# Patient Record
Sex: MALE | Race: WHITE | NOT HISPANIC OR LATINO | Employment: UNEMPLOYED | URBAN - METROPOLITAN AREA
[De-identification: names, ages, dates, MRNs, and addresses within clinical notes are randomized per-mention and may not be internally consistent; named-entity substitution may affect disease eponyms.]

---

## 2017-12-19 ENCOUNTER — APPOINTMENT (OUTPATIENT)
Dept: CT IMAGING | Facility: HOSPITAL | Age: 27
End: 2017-12-19

## 2017-12-19 ENCOUNTER — HOSPITAL ENCOUNTER (OUTPATIENT)
Facility: HOSPITAL | Age: 27
Setting detail: OBSERVATION
Discharge: HOME | End: 2017-12-20
Attending: EMERGENCY MEDICINE | Admitting: SURGERY

## 2017-12-19 DIAGNOSIS — R10.31 RIGHT LOWER QUADRANT ABDOMINAL PAIN: Primary | ICD-10-CM

## 2017-12-19 DIAGNOSIS — D72.829 LEUKOCYTOSIS, UNSPECIFIED TYPE: ICD-10-CM

## 2017-12-19 DIAGNOSIS — K59.00 CONSTIPATION, UNSPECIFIED CONSTIPATION TYPE: ICD-10-CM

## 2017-12-19 PROBLEM — R10.9 ABDOMINAL PAIN: Status: ACTIVE | Noted: 2017-12-19

## 2017-12-19 LAB
ALBUMIN SERPL-MCNC: 4.9 G/DL (ref 3.5–5.2)
ALBUMIN/GLOB SERPL: 1.8 G/DL
ALP SERPL-CCNC: 46 U/L (ref 39–117)
ALT SERPL W P-5'-P-CCNC: 15 U/L (ref 1–41)
ANION GAP SERPL CALCULATED.3IONS-SCNC: 13.5 MMOL/L
AST SERPL-CCNC: 19 U/L (ref 1–40)
BASOPHILS # BLD AUTO: 0.03 10*3/MM3 (ref 0–0.2)
BASOPHILS NFR BLD AUTO: 0.2 % (ref 0–1.5)
BILIRUB SERPL-MCNC: 0.5 MG/DL (ref 0.1–1.2)
BILIRUB UR QL STRIP: NEGATIVE
BUN BLD-MCNC: 20 MG/DL (ref 6–20)
BUN/CREAT SERPL: 20.2 (ref 7–25)
CALCIUM SPEC-SCNC: 10 MG/DL (ref 8.6–10.5)
CHLORIDE SERPL-SCNC: 101 MMOL/L (ref 98–107)
CLARITY UR: CLEAR
CO2 SERPL-SCNC: 25.5 MMOL/L (ref 22–29)
COLOR UR: YELLOW
CREAT BLD-MCNC: 0.99 MG/DL (ref 0.76–1.27)
DEPRECATED RDW RBC AUTO: 42.6 FL (ref 37–54)
EOSINOPHIL # BLD AUTO: 0.15 10*3/MM3 (ref 0–0.7)
EOSINOPHIL NFR BLD AUTO: 0.9 % (ref 0.3–6.2)
ERYTHROCYTE [DISTWIDTH] IN BLOOD BY AUTOMATED COUNT: 13.3 % (ref 11.5–14.5)
GFR SERPL CREATININE-BSD FRML MDRD: 89 ML/MIN/1.73
GLOBULIN UR ELPH-MCNC: 2.8 GM/DL
GLUCOSE BLD-MCNC: 94 MG/DL (ref 65–99)
GLUCOSE UR STRIP-MCNC: NEGATIVE MG/DL
HCT VFR BLD AUTO: 43 % (ref 40.4–52.2)
HGB BLD-MCNC: 14.6 G/DL (ref 13.7–17.6)
HGB UR QL STRIP.AUTO: NEGATIVE
HOLD SPECIMEN: NORMAL
HOLD SPECIMEN: NORMAL
IMM GRANULOCYTES # BLD: 0.05 10*3/MM3 (ref 0–0.03)
IMM GRANULOCYTES NFR BLD: 0.3 % (ref 0–0.5)
KETONES UR QL STRIP: ABNORMAL
LEUKOCYTE ESTERASE UR QL STRIP.AUTO: NEGATIVE
LIPASE SERPL-CCNC: 16 U/L (ref 13–60)
LYMPHOCYTES # BLD AUTO: 2.02 10*3/MM3 (ref 0.9–4.8)
LYMPHOCYTES NFR BLD AUTO: 12.2 % (ref 19.6–45.3)
MCH RBC QN AUTO: 29.7 PG (ref 27–32.7)
MCHC RBC AUTO-ENTMCNC: 34 G/DL (ref 32.6–36.4)
MCV RBC AUTO: 87.4 FL (ref 79.8–96.2)
MONOCYTES # BLD AUTO: 1.27 10*3/MM3 (ref 0.2–1.2)
MONOCYTES NFR BLD AUTO: 7.7 % (ref 5–12)
NEUTROPHILS # BLD AUTO: 13.03 10*3/MM3 (ref 1.9–8.1)
NEUTROPHILS NFR BLD AUTO: 78.7 % (ref 42.7–76)
NITRITE UR QL STRIP: NEGATIVE
PH UR STRIP.AUTO: 5.5 [PH] (ref 5–8)
PLATELET # BLD AUTO: 311 10*3/MM3 (ref 140–500)
PMV BLD AUTO: 9.6 FL (ref 6–12)
POTASSIUM BLD-SCNC: 4.1 MMOL/L (ref 3.5–5.2)
PROT SERPL-MCNC: 7.7 G/DL (ref 6–8.5)
PROT UR QL STRIP: NEGATIVE
RBC # BLD AUTO: 4.92 10*6/MM3 (ref 4.6–6)
SODIUM BLD-SCNC: 140 MMOL/L (ref 136–145)
SP GR UR STRIP: 1.03 (ref 1–1.03)
UROBILINOGEN UR QL STRIP: ABNORMAL
WBC NRBC COR # BLD: 16.55 10*3/MM3 (ref 4.5–10.7)
WHOLE BLOOD HOLD SPECIMEN: NORMAL
WHOLE BLOOD HOLD SPECIMEN: NORMAL

## 2017-12-19 PROCEDURE — 85025 COMPLETE CBC W/AUTO DIFF WBC: CPT | Performed by: EMERGENCY MEDICINE

## 2017-12-19 PROCEDURE — 81003 URINALYSIS AUTO W/O SCOPE: CPT | Performed by: EMERGENCY MEDICINE

## 2017-12-19 PROCEDURE — 96361 HYDRATE IV INFUSION ADD-ON: CPT

## 2017-12-19 PROCEDURE — 99284 EMERGENCY DEPT VISIT MOD MDM: CPT

## 2017-12-19 PROCEDURE — 96360 HYDRATION IV INFUSION INIT: CPT

## 2017-12-19 PROCEDURE — G0378 HOSPITAL OBSERVATION PER HR: HCPCS

## 2017-12-19 PROCEDURE — 36415 COLL VENOUS BLD VENIPUNCTURE: CPT | Performed by: EMERGENCY MEDICINE

## 2017-12-19 PROCEDURE — 83690 ASSAY OF LIPASE: CPT | Performed by: EMERGENCY MEDICINE

## 2017-12-19 PROCEDURE — 0 IOPAMIDOL 61 % SOLUTION: Performed by: EMERGENCY MEDICINE

## 2017-12-19 PROCEDURE — 25810000003 SODIUM CHLORIDE 0.9 % WITH KCL 20 MEQ 20-0.9 MEQ/L-% SOLUTION: Performed by: SURGERY

## 2017-12-19 PROCEDURE — 74177 CT ABD & PELVIS W/CONTRAST: CPT

## 2017-12-19 PROCEDURE — 80053 COMPREHEN METABOLIC PANEL: CPT | Performed by: EMERGENCY MEDICINE

## 2017-12-19 RX ORDER — PROMETHAZINE HYDROCHLORIDE 25 MG/ML
12.5 INJECTION, SOLUTION INTRAMUSCULAR; INTRAVENOUS EVERY 6 HOURS PRN
Status: DISCONTINUED | OUTPATIENT
Start: 2017-12-19 | End: 2017-12-20 | Stop reason: HOSPADM

## 2017-12-19 RX ORDER — SODIUM CHLORIDE 9 MG/ML
125 INJECTION, SOLUTION INTRAVENOUS CONTINUOUS
Status: DISCONTINUED | OUTPATIENT
Start: 2017-12-19 | End: 2017-12-20 | Stop reason: HOSPADM

## 2017-12-19 RX ORDER — SODIUM CHLORIDE 0.9 % (FLUSH) 0.9 %
10 SYRINGE (ML) INJECTION AS NEEDED
Status: DISCONTINUED | OUTPATIENT
Start: 2017-12-19 | End: 2017-12-20 | Stop reason: HOSPADM

## 2017-12-19 RX ORDER — BUPRENORPHINE AND NALOXONE 8; 2 MG/1; MG/1
1 FILM, SOLUBLE BUCCAL; SUBLINGUAL DAILY
Status: DISCONTINUED | OUTPATIENT
Start: 2017-12-19 | End: 2017-12-20 | Stop reason: HOSPADM

## 2017-12-19 RX ORDER — SODIUM CHLORIDE AND POTASSIUM CHLORIDE 150; 900 MG/100ML; MG/100ML
150 INJECTION, SOLUTION INTRAVENOUS CONTINUOUS
Status: DISCONTINUED | OUTPATIENT
Start: 2017-12-19 | End: 2017-12-20 | Stop reason: HOSPADM

## 2017-12-19 RX ORDER — BUPRENORPHINE HYDROCHLORIDE AND NALOXONE HYDROCHLORIDE DIHYDRATE 8; 2 MG/1; MG/1
1 TABLET SUBLINGUAL 3 TIMES DAILY
COMMUNITY

## 2017-12-19 RX ORDER — PROMETHAZINE HYDROCHLORIDE 12.5 MG/1
12.5 TABLET ORAL EVERY 6 HOURS PRN
Status: DISCONTINUED | OUTPATIENT
Start: 2017-12-19 | End: 2017-12-20 | Stop reason: HOSPADM

## 2017-12-19 RX ORDER — SODIUM CHLORIDE 0.9 % (FLUSH) 0.9 %
1-10 SYRINGE (ML) INJECTION AS NEEDED
Status: DISCONTINUED | OUTPATIENT
Start: 2017-12-19 | End: 2017-12-20 | Stop reason: HOSPADM

## 2017-12-19 RX ORDER — PROMETHAZINE HYDROCHLORIDE 25 MG/1
12.5 SUPPOSITORY RECTAL EVERY 6 HOURS PRN
Status: DISCONTINUED | OUTPATIENT
Start: 2017-12-19 | End: 2017-12-20 | Stop reason: HOSPADM

## 2017-12-19 RX ORDER — NICOTINE 21 MG/24HR
1 PATCH, TRANSDERMAL 24 HOURS TRANSDERMAL EVERY 24 HOURS
Status: DISCONTINUED | OUTPATIENT
Start: 2017-12-19 | End: 2017-12-20 | Stop reason: HOSPADM

## 2017-12-19 RX ADMIN — SODIUM CHLORIDE 1000 ML: 9 INJECTION, SOLUTION INTRAVENOUS at 20:55

## 2017-12-19 RX ADMIN — SODIUM CHLORIDE 125 ML/HR: 9 INJECTION, SOLUTION INTRAVENOUS at 21:59

## 2017-12-19 RX ADMIN — IOPAMIDOL 95 ML: 612 INJECTION, SOLUTION INTRAVENOUS at 21:36

## 2017-12-19 RX ADMIN — NICOTINE 1 PATCH: 21 PATCH, EXTENDED RELEASE TRANSDERMAL at 23:25

## 2017-12-19 RX ADMIN — POTASSIUM CHLORIDE AND SODIUM CHLORIDE 150 ML/HR: 900; 150 INJECTION, SOLUTION INTRAVENOUS at 23:25

## 2017-12-19 NOTE — ED TRIAGE NOTES
Patient states his stomach started hurting about an hour ago and states it is in lower abdomen and also in his lower left and right quadrants

## 2017-12-20 VITALS
WEIGHT: 145 LBS | BODY MASS INDEX: 23.3 KG/M2 | HEIGHT: 66 IN | OXYGEN SATURATION: 98 % | TEMPERATURE: 98.6 F | SYSTOLIC BLOOD PRESSURE: 92 MMHG | HEART RATE: 77 BPM | DIASTOLIC BLOOD PRESSURE: 59 MMHG | RESPIRATION RATE: 16 BRPM

## 2017-12-20 LAB
ANION GAP SERPL CALCULATED.3IONS-SCNC: 8.4 MMOL/L
BASOPHILS # BLD AUTO: 0.02 10*3/MM3 (ref 0–0.2)
BASOPHILS NFR BLD AUTO: 0.2 % (ref 0–1.5)
BUN BLD-MCNC: 15 MG/DL (ref 6–20)
BUN/CREAT SERPL: 18.8 (ref 7–25)
CALCIUM SPEC-SCNC: 8.2 MG/DL (ref 8.6–10.5)
CHLORIDE SERPL-SCNC: 108 MMOL/L (ref 98–107)
CO2 SERPL-SCNC: 25.6 MMOL/L (ref 22–29)
CREAT BLD-MCNC: 0.8 MG/DL (ref 0.76–1.27)
DEPRECATED RDW RBC AUTO: 43.4 FL (ref 37–54)
EOSINOPHIL # BLD AUTO: 0.35 10*3/MM3 (ref 0–0.7)
EOSINOPHIL NFR BLD AUTO: 3.5 % (ref 0.3–6.2)
ERYTHROCYTE [DISTWIDTH] IN BLOOD BY AUTOMATED COUNT: 13.5 % (ref 11.5–14.5)
GFR SERPL CREATININE-BSD FRML MDRD: 114 ML/MIN/1.73
GLUCOSE BLD-MCNC: 72 MG/DL (ref 65–99)
HCT VFR BLD AUTO: 36.4 % (ref 40.4–52.2)
HGB BLD-MCNC: 12.1 G/DL (ref 13.7–17.6)
IMM GRANULOCYTES # BLD: 0.02 10*3/MM3 (ref 0–0.03)
IMM GRANULOCYTES NFR BLD: 0.2 % (ref 0–0.5)
LYMPHOCYTES # BLD AUTO: 3.47 10*3/MM3 (ref 0.9–4.8)
LYMPHOCYTES NFR BLD AUTO: 35.1 % (ref 19.6–45.3)
MCH RBC QN AUTO: 29.5 PG (ref 27–32.7)
MCHC RBC AUTO-ENTMCNC: 33.2 G/DL (ref 32.6–36.4)
MCV RBC AUTO: 88.8 FL (ref 79.8–96.2)
MONOCYTES # BLD AUTO: 0.89 10*3/MM3 (ref 0.2–1.2)
MONOCYTES NFR BLD AUTO: 9 % (ref 5–12)
NEUTROPHILS # BLD AUTO: 5.14 10*3/MM3 (ref 1.9–8.1)
NEUTROPHILS NFR BLD AUTO: 52 % (ref 42.7–76)
PLATELET # BLD AUTO: 257 10*3/MM3 (ref 140–500)
PMV BLD AUTO: 10.2 FL (ref 6–12)
POTASSIUM BLD-SCNC: 4.1 MMOL/L (ref 3.5–5.2)
RBC # BLD AUTO: 4.1 10*6/MM3 (ref 4.6–6)
SODIUM BLD-SCNC: 142 MMOL/L (ref 136–145)
WBC NRBC COR # BLD: 9.89 10*3/MM3 (ref 4.5–10.7)

## 2017-12-20 PROCEDURE — 80048 BASIC METABOLIC PNL TOTAL CA: CPT | Performed by: SURGERY

## 2017-12-20 PROCEDURE — 85025 COMPLETE CBC W/AUTO DIFF WBC: CPT | Performed by: SURGERY

## 2017-12-20 PROCEDURE — S0260 H&P FOR SURGERY: HCPCS | Performed by: SURGERY

## 2017-12-20 PROCEDURE — 99217 PR OBSERVATION CARE DISCHARGE MANAGEMENT: CPT | Performed by: SURGERY

## 2017-12-20 PROCEDURE — G0378 HOSPITAL OBSERVATION PER HR: HCPCS

## 2017-12-20 NOTE — PLAN OF CARE
Problem: Patient Care Overview (Adult)  Goal: Plan of Care Review  Outcome: Ongoing (interventions implemented as appropriate)   12/20/17 0513   Coping/Psychosocial Response Interventions   Plan Of Care Reviewed With patient   Patient Care Overview   Progress improving   Outcome Evaluation   Outcome Summary/Follow up Plan Pt admitted as observation from ED overnight for abdominal pain/constipation. NPO with IVF. No complaints of pain or n/v overnight. VSS      Goal: Discharge Needs Assessment  Outcome: Ongoing (interventions implemented as appropriate)   12/20/17 0513   Discharge Needs Assessment   Concerns To Be Addressed no discharge needs identified;denies needs/concerns at this time       Problem: Constipation (Adult)  Goal: Identify Related Risk Factors and Signs and Symptoms  Outcome: Outcome(s) achieved Date Met: 12/20/17 12/20/17 0513   Constipation   Signs and Symptoms (Constipation) abdomen firm, tender;abdominal pain/cramps;other (see comments)  (heavy stool burden on CT)     Goal: Effective Bowel Elimination  Outcome: Ongoing (interventions implemented as appropriate)   12/20/17 0513   Constipation (Adult)   Effective Bowel Elimination making progress toward outcome     Goal: Comfort  Outcome: Ongoing (interventions implemented as appropriate)   12/20/17 0513   Constipation (Adult)   Comfort making progress toward outcome       Problem: Pain, Acute (Adult)  Goal: Identify Related Risk Factors and Signs and Symptoms  Outcome: Ongoing (interventions implemented as appropriate)   12/20/17 0513   Pain, Acute   Related Risk Factors (Acute Pain) disease process     Goal: Acceptable Pain Control/Comfort Level  Outcome: Ongoing (interventions implemented as appropriate)   12/20/17 0513   Pain, Acute (Adult)   Acceptable Pain Control/Comfort Level making progress toward outcome

## 2017-12-20 NOTE — ED PROVIDER NOTES
" EMERGENCY DEPARTMENT ENCOUNTER    CHIEF COMPLAINT  Chief Complaint: abdominal pain  History given by: patient  History limited by: none  Room Number: 20/20  PMD: Provider Not In System      HPI:  Pt is a 27 y.o. male who presents complaining of \"stabbing\" suprapubic and \"cramping\" RLQ abdominal pain onset PTA while at work. Pain is aggravated by movement. Patient reports that he initially had diffuse scrotal discomfort that is now resolved. Associated sx include constipation for 2 days and vomiting x1 in the past 24 hours. There are no c/o fever, urinary sx, chest pain, SOA, cough, extremity swelling or other symptoms at this time. Patient is a frequent tobacco user but denies EtOH/recreational drug use.     Duration:  Since PTA  Onset: gradual  Timing: constant  Location: suprapubic and RLQ  Radiation: initially to scrotum, now resolved  Quality: \"stabbing\" suprapubic and \"cramping\" RLQ   Intensity/Severity: moderate  Progression: unchanged  Associated Symptoms: vomiting, constipation  Aggravating Factors: movement  Alleviating Factors: none stated  Previous Episodes: none stated  Treatment before arrival: one stated    PAST MEDICAL HISTORY  Active Ambulatory Problems     Diagnosis Date Noted   • No Active Ambulatory Problems     Resolved Ambulatory Problems     Diagnosis Date Noted   • No Resolved Ambulatory Problems     No Additional Past Medical History       PAST SURGICAL HISTORY  History reviewed. No pertinent surgical history.    FAMILY HISTORY  History reviewed. No pertinent family history.    SOCIAL HISTORY  Social History     Social History   • Marital status: Single     Spouse name: N/A   • Number of children: N/A   • Years of education: N/A     Occupational History   • Not on file.     Social History Main Topics   • Smoking status: Light Tobacco Smoker     Types: Cigarettes   • Smokeless tobacco: Not on file   • Alcohol use No   • Drug use: No   • Sexual activity: Not on file     Other Topics Concern " "  • Not on file     Social History Narrative   • No narrative on file       ALLERGIES  Penicillins    REVIEW OF SYSTEMS  Review of Systems   Constitutional: Negative for chills and fever.   HENT: Negative for sore throat and trouble swallowing.    Eyes: Negative for visual disturbance.   Respiratory: Negative for cough and shortness of breath.    Cardiovascular: Negative for chest pain and leg swelling.   Gastrointestinal: Positive for abdominal pain (\"stabbing\" suprapubic and \"cramping\" RLQ), constipation and vomiting. Negative for diarrhea.   Endocrine: Negative.    Genitourinary: Negative for decreased urine volume and frequency.        Radiating scrotal pain now resolved   Musculoskeletal: Negative for neck pain.   Skin: Negative for rash.   Allergic/Immunologic: Negative.    Neurological: Negative for weakness and numbness.   Hematological: Negative.    Psychiatric/Behavioral: Negative.    All other systems reviewed and are negative.      PHYSICAL EXAM  ED Triage Vitals   Temp Heart Rate Resp BP SpO2   12/19/17 1702 12/19/17 1702 12/19/17 1718 12/19/17 1717 12/19/17 1702   97.4 °F (36.3 °C) 83 18 102/70 94 %      Temp src Heart Rate Source Patient Position BP Location FiO2 (%)   12/19/17 1702 12/19/17 1702 -- -- --   Temporal Art Monitor          Physical Exam   Constitutional: He is oriented to person, place, and time.  Non-toxic appearance. He appears distressed.   HENT:   Head: Normocephalic and atraumatic.   Eyes: EOM are normal.   Neck: Normal range of motion.   Cardiovascular: Normal rate, regular rhythm and normal heart sounds.    No murmur heard.  Pulses:       Posterior tibial pulses are 2+ on the right side, and 2+ on the left side.   Pulmonary/Chest: Effort normal and breath sounds normal. No respiratory distress. He has no wheezes.   Abdominal: Soft. Bowel sounds are normal. There is tenderness (suprapbuic and RLQ). There is no rebound and no guarding.   Genitourinary:   Genitourinary Comments: " There is no testicle swelling or tenderness, no hernia, pt circumcised   Musculoskeletal: Normal range of motion. He exhibits no edema.   Neurological: He is alert and oriented to person, place, and time.   Skin: Skin is warm and dry.   Psychiatric: Affect normal.   Nursing note and vitals reviewed.      LAB RESULTS  Lab Results (last 24 hours)     Procedure Component Value Units Date/Time    CBC & Differential [[id number]] Collected:  12/19/17 1832    Specimen:  Blood Updated:  12/19/17 1847    Narrative:       The following orders were created for panel order CBC & Differential.  Procedure                               Abnormality         Status                     ---------                               -----------         ------                     CBC Auto Differential[[id number]]        Abnormal            Final result                 Please view results for these tests on the individual orders.    Comprehensive Metabolic Panel [[id number]] Collected:  12/19/17 1832    Specimen:  Blood Updated:  12/19/17 1912     Glucose 94 mg/dL      BUN 20 mg/dL      Creatinine 0.99 mg/dL      Sodium 140 mmol/L      Potassium 4.1 mmol/L      Chloride 101 mmol/L      CO2 25.5 mmol/L      Calcium 10.0 mg/dL      Total Protein 7.7 g/dL      Albumin 4.90 g/dL      ALT (SGPT) 15 U/L      AST (SGOT) 19 U/L      Alkaline Phosphatase 46 U/L      Total Bilirubin 0.5 mg/dL      eGFR Non African Amer 89 mL/min/1.73      Globulin 2.8 gm/dL      A/G Ratio 1.8 g/dL      BUN/Creatinine Ratio 20.2     Anion Gap 13.5 mmol/L     Lipase [[id number]]  (Normal) Collected:  12/19/17 1832    Specimen:  Blood Updated:  12/19/17 1912     Lipase 16 U/L     CBC Auto Differential [[id number]]  (Abnormal) Collected:  12/19/17 1832    Specimen:  Blood Updated:  12/19/17 1847     WBC 16.55 (H) 10*3/mm3      RBC 4.92 10*6/mm3      Hemoglobin 14.6 g/dL      Hematocrit 43.0 %      MCV 87.4 fL      MCH 29.7 pg      MCHC 34.0 g/dL      RDW 13.3 %      RDW-SD 42.6  fl      MPV 9.6 fL      Platelets 311 10*3/mm3      Neutrophil % 78.7 (H) %      Lymphocyte % 12.2 (L) %      Monocyte % 7.7 %      Eosinophil % 0.9 %      Basophil % 0.2 %      Immature Grans % 0.3 %      Neutrophils, Absolute 13.03 (H) 10*3/mm3      Lymphocytes, Absolute 2.02 10*3/mm3      Monocytes, Absolute 1.27 (H) 10*3/mm3      Eosinophils, Absolute 0.15 10*3/mm3      Basophils, Absolute 0.03 10*3/mm3      Immature Grans, Absolute 0.05 (H) 10*3/mm3     Urinalysis With / Culture If Indicated - Urine, Clean Catch [[id number]]  (Abnormal) Collected:  12/19/17 2048    Specimen:  Urine from Urine, Clean Catch Updated:  12/19/17 2101     Color, UA Yellow     Appearance, UA Clear     pH, UA 5.5     Specific Gravity, UA 1.029     Glucose, UA Negative     Ketones, UA 40 mg/dL (2+) (A)     Bilirubin, UA Negative     Blood, UA Negative     Protein, UA Negative     Leuk Esterase, UA Negative     Nitrite, UA Negative     Urobilinogen, UA 0.2 E.U./dL    Narrative:       Urine microscopic not indicated.          I ordered the above labs and reviewed the results    RADIOLOGY  CT Abdomen Pelvis With Contrast   Final Result   IMPRESSION :    1. Constipation without obstruction or acute inflammation           This report was finalized on 12/19/2017 9:47 PM by Ji Gtz MD.           appendix not seen on imaging    I ordered the above noted radiological studies.Discussed with Dr Gtz. Reviewed by me in PACS.       PROCEDURES  Procedures      PROGRESS AND CONSULTS  ED Course   1718: Ordered CBC, UA, lipase and CMP per protocol.    2041: Ordered CT Abd/Pelvis to evaluate abdominal pain. Ordered IVF for hydration.     2209: Rechecked patient who reports no improvement in his sx. Informed patient of CT results which revealed constipation but no obvious signs of appendicitis. However this does not r/o inflammation of the appendix. Discussed plan to consult with general surgery.     2223: Discussed patient's case with Dr. Styles,  general surgery, including concerns regarding patient inconsistent CT Abd/Pelvis and symptoms as well as elevated WBC. She agrees to admit the patient for observation and serial exams to further evaluate/treat as needed for the evolution of his abdominal discomfort.    2237: Informed patient that due to his presentation and inconsistent radiology results, Dr. Styles has agreed to admit him for observation/serial exam and r/o of appendicitis. Pt understands and agrees with the plan. All questions answered.    Discussed with pt's RN, Adriane, that pt make take his home dose of suboxone x 1 which he reports he has with him and requested for her to order a nicotine patch for the patient.    MEDICAL DECISION MAKING  Results were reviewed/discussed with the patient and they were also made aware of online access. Pt also made aware that some labs, such as cultures, will not be resulted during ER visit and follow up with PMD is necessary.     MDM  Number of Diagnoses or Management Options     Amount and/or Complexity of Data Reviewed  Clinical lab tests: ordered and reviewed (WBC-16.55)  Tests in the radiology section of CPT®: ordered and reviewed (CT Abd/Pelvis shows constipation without obstruction or acute inflammation)  Decide to obtain previous medical records or to obtain history from someone other than the patient: yes  Independent visualization of images, tracings, or specimens: yes    Patient Progress  Patient progress: stable         DIAGNOSIS  Final diagnoses:   Right lower quadrant abdominal pain   Constipation, unspecified constipation type   Leukocytosis, unspecified type       DISPOSITION  ADMISSION    Discussed treatment plan and reason for admission with pt/family and admitting physician.  Pt/family voiced understanding of the plan for admission for further testing/treatment as needed.       Latest Documented Vital Signs:  As of 10:40 PM  BP- 109/72 HR- 82 Temp- 97.4 °F (36.3 °C) (Temporal Artery ) O2 sat-  100%    --  Documentation assistance provided by mee Gray for Dr. Markham.  Information recorded by the scribadeel was done at my direction and has been verified and validated by me.         Julieth Gray  12/19/17 0735       Breana Markham MD  12/20/17 0720

## 2017-12-20 NOTE — DISCHARGE SUMMARY
Admitting date: 12/19/2017    Discharging date: 12/20/2017    Admitting diagnosis: Suprapubic and right lower abdominal pain    Discharging diagnoses: Benign abdomen    Admitting/discharging physician: Dr. Styles    Procedures: None    Hospital course:  This is a pleasant 27-year-old gentleman who is admitted to the emergency room for observation with an inconclusive CT scan for appendicitis and elevated white count of 16.  Patient's white count came down to 9 the following morning and pain had improved.  Patient was discharged to home in stable condition to follow-up as needed.        Olga Styles MD  General, Minimally Invasive and Endoscopic Surgery  [facility]    [address], [address]  [place], [place], [address]  P: [phone]  F: [phone]    Cc:  Provider Not In System

## 2017-12-20 NOTE — PROGRESS NOTES
Case Management Discharge Note    Final Note: Dc'd home via private auto    Discharge Placement     No information found        Other: Other (private auto)    Discharge Codes: 01  Discharge to home

## 2017-12-20 NOTE — PLAN OF CARE
Problem: Patient Care Overview (Adult)  Goal: Plan of Care Review  Outcome: Ongoing (interventions implemented as appropriate)   12/20/17 0926   Coping/Psychosocial Response Interventions   Plan Of Care Reviewed With patient   Patient Care Overview   Progress improving   Outcome Evaluation   Outcome Summary/Follow up Plan Pt hasn't complained of pain. Tolerating a regular breakfast. VSS, plan is for patient to go home today.       Problem: Constipation (Adult)  Goal: Effective Bowel Elimination  Outcome: Ongoing (interventions implemented as appropriate)    Goal: Comfort  Outcome: Ongoing (interventions implemented as appropriate)      Problem: Pain, Acute (Adult)  Goal: Identify Related Risk Factors and Signs and Symptoms  Outcome: Ongoing (interventions implemented as appropriate)    Goal: Acceptable Pain Control/Comfort Level  Outcome: Ongoing (interventions implemented as appropriate)

## 2017-12-20 NOTE — H&P
Chief Complaint   Patient presents with   • Abdominal Pain   • Nausea        Patient is a 27 y.o. male presented to the emergency room on 12/19/2017 with acute onset of suprapubic pain that radiated over onto the right side.  Patient reported that the pain started at 3:00 in the afternoon and continued to worsen until he called the ambulance.  Patient denied nausea, vomiting, chills, fever or diarrhea.  Patient had never had this pain before.  Patient described the pain as a crampy stabbing pain.  Patient reports nothing made it better moving made it worse.     History reviewed. No pertinent past medical history.  History reviewed. No pertinent surgical history.  History reviewed. No pertinent family history.  Social History   Substance Use Topics   • Smoking status: Light Tobacco Smoker     Types: Cigarettes   • Smokeless tobacco: None   • Alcohol use No         Current Facility-Administered Medications:   •  buprenorphine-naloxone (SUBOXONE) 8-2 MG film 1 film, 1 film, Sublingual, Daily, Breana Markham MD, Stopped at 12/19/17 2337  •  nicotine (NICODERM CQ) 21 MG/24HR patch 1 patch, 1 patch, Transdermal, Q24H, Breana Markham MD, 1 patch at 12/19/17 2325  •  promethazine (PHENERGAN) tablet 12.5 mg, 12.5 mg, Oral, Q6H PRN **OR** promethazine (PHENERGAN) injection 12.5 mg, 12.5 mg, Intramuscular, Q6H PRN **OR** promethazine (PHENERGAN) suppository 12.5 mg, 12.5 mg, Rectal, Q6H PRN, Olga Styles MD  •  sodium chloride 0.9 % flush 1-10 mL, 1-10 mL, Intravenous, PRN, Olga Styles MD  •  sodium chloride 0.9 % flush 10 mL, 10 mL, Intravenous, PRN, Breana Markham MD  •  sodium chloride 0.9 % infusion, 125 mL/hr, Intravenous, Continuous, Breana Markham MD, Stopped at 12/19/17 2321  •  sodium chloride 0.9 % with KCl 20 mEq/L infusion, 150 mL/hr, Intravenous, Continuous, Olga Styles MD, Last Rate: 150 mL/hr at 12/19/17 2325, 150 mL/hr at 12/19/17 8802    Review of Systems   General: Patient reports good  health  Eyes: No eye problems  Ears, nose, mouth and throat: No rhinitis, no hearing problems, no chronic cough  Cardiovascular/heart: Denies palpitations, syncope or chest pain  Respiratory/lung: Denies shortness of breath, hemoptysis, or dyspnea on exertion   Genital/urinary: No frequency, hematuria or dysuria  Hematological/lymphatic: Denies anemia or other problems  Musculoskeletal: No joint pain, no defects  Skin: No psoriasis or other skin issues  Neurological: No seizures or other neurological problems  Psychiatric: None  Endocrine: Negative  Gastro-intestinal: positive constipation, no diarrhea, no melena, no hematochezia  All other systems have been reviewed and are negative.  Vitals:    12/19/17 2243 12/19/17 2252 12/19/17 2300 12/20/17 0731   BP: 104/76  106/58 92/59   BP Location:    Right arm   Patient Position:    Lying   Pulse:    77   Resp:   16 16   Temp:  97.9 °F (36.6 °C) 98 °F (36.7 °C) 98.6 °F (37 °C)   TempSrc:  Oral Oral Oral   SpO2: 97%   98%   Weight:       Height:           Physical Exam  General/physcological:   Alert and oriented x3 in no acute distress  HEENT: Normal cephalic, atraumatic, PERRLA, EOMI, sclera anicteric, moist mucous membranes, neck is supple, no JVD, no carotid bruits, no thyromegaly no adenopathy  Respiratory: CTA and percussion  CVA: RRR, normal S1-S2, no murmurs, no gallops or rubs  GI: Positive BS, soft, nondistended, nontender, no rebound, no guarding, no hernias, no organomegaly and no masses  Musculoskeletal: Full range of motion, no clubbing, no cyanosis or edema  Neurovascular: Grossly intact    Patient does use tobacco products currently and I have counseled the patient to stop using tobacco products in the future.    Results from last 7 days  Lab Units 12/20/17  0551   WBC 10*3/mm3 9.89   HEMOGLOBIN g/dL 12.1*   HEMATOCRIT % 36.4*   PLATELETS 10*3/mm3 257       Results from last 7 days  Lab Units 12/20/17  0551   SODIUM mmol/L 142   POTASSIUM mmol/L 4.1    CHLORIDE mmol/L 108*   CO2 mmol/L 25.6   BUN mg/dL 15   CREATININE mg/dL 0.80   GLUCOSE mg/dL 72   CALCIUM mg/dL 8.2*     CT scan was reviewed and is inconclusive for appendicitis    Assessment:  Abdominal pain  Plan:  This is a pleasant 27-year-old gentleman with abdominal pain in the suprapubic area radiating to the right.  Patient was admitted for an inconclusive CT scan for appendicitis with an elevated white count of 16.  Today the pain has improved and the white count is normal.  I will DC the patient to home to follow-up as needed.    Olga Styles MD  General, Minimally Invasive and Endoscopic Surgery  [facility]    [address], [address]  [place], [place], [address]  P: [phone]  F: [phone]